# Patient Record
Sex: FEMALE | Race: BLACK OR AFRICAN AMERICAN | NOT HISPANIC OR LATINO | Employment: FULL TIME | URBAN - METROPOLITAN AREA
[De-identification: names, ages, dates, MRNs, and addresses within clinical notes are randomized per-mention and may not be internally consistent; named-entity substitution may affect disease eponyms.]

---

## 2025-06-09 ENCOUNTER — HOSPITAL ENCOUNTER (OUTPATIENT)
Dept: RADIOLOGY | Facility: CLINIC | Age: 30
Discharge: HOME | End: 2025-06-09
Payer: MEDICAID

## 2025-06-09 ENCOUNTER — OFFICE VISIT (OUTPATIENT)
Dept: ORTHOPEDIC SURGERY | Facility: CLINIC | Age: 30
End: 2025-06-09
Payer: MEDICAID

## 2025-06-09 VITALS — WEIGHT: 260 LBS | BODY MASS INDEX: 39.4 KG/M2 | HEIGHT: 68 IN

## 2025-06-09 DIAGNOSIS — S92.511A CLOSED FRACTURE OF PROXIMAL PHALANX OF LESSER TOE OF RIGHT FOOT, INITIAL ENCOUNTER: Primary | ICD-10-CM

## 2025-06-09 DIAGNOSIS — S92.511A CLOSED FRACTURE OF PROXIMAL PHALANX OF LESSER TOE OF RIGHT FOOT, INITIAL ENCOUNTER: ICD-10-CM

## 2025-06-09 PROCEDURE — 73660 X-RAY EXAM OF TOE(S): CPT | Mod: RIGHT SIDE | Performed by: RADIOLOGY

## 2025-06-09 PROCEDURE — 73660 X-RAY EXAM OF TOE(S): CPT | Mod: RT

## 2025-06-09 PROCEDURE — 3008F BODY MASS INDEX DOCD: CPT | Performed by: FAMILY MEDICINE

## 2025-06-09 PROCEDURE — 1036F TOBACCO NON-USER: CPT | Performed by: FAMILY MEDICINE

## 2025-06-09 PROCEDURE — 99203 OFFICE O/P NEW LOW 30 MIN: CPT | Performed by: FAMILY MEDICINE

## 2025-06-09 NOTE — PROGRESS NOTES
History of Present Illness   Chief Complaint   Patient presents with    Right Foot - Pain     Closed nondisplaced fracture of proximal phalanx of lesser toe        The patient is 30 y.o. female  here with a complaint of right foot fourth digit injury.  Injury occurred approximately 6 weeks ago after injuring her toe falling down some stairs, patient says that her toe seem to be pointing in the wrong direction, friend pulled on the toe to straighten it out.  She was seen in the [facility], operated by [facility] emergency department following injury, she had x-rays that revealed a nondisplaced fracture of the proximal phalanx of the fourth digit, she followed up in podiatry, recommended boot immobilization for 6 weeks and follow-up at that time.  Patient had insurance change and needed to follow-up with a different provider prompting her to be seen by me today for further evaluation.  Overall symptoms have improved, still with some swelling and mild discomfort in the fourth digit, feels like it is slightly crooked compared to her other foot and adjacent toes.  She denies any numbness or tingling.  Patient works as a  part-time, she is working in her boot.    Medical History[1]    Medication Documentation Review Audit    **Prior to Admission medications have not yet been reviewed**         RX Allergies[2]    Social History     Socioeconomic History    Marital status: Single     Spouse name: Not on file    Number of children: Not on file    Years of education: Not on file    Highest education level: Not on file   Occupational History    Not on file   Tobacco Use    Smoking status: Not on file    Smokeless tobacco: Not on file   Substance and Sexual Activity    Alcohol use: Not on file    Drug use: Not on file    Sexual activity: Not on file   Other Topics Concern    Not on file   Social History Narrative    Not on file     Social Drivers of Health     Financial Resource Strain: High Risk (4/26/2021)    Received from [facility]     Overall Financial Resource Strain (CARDIA)     Difficulty of Paying Living Expenses: Very hard   Food Insecurity: Food Insecurity Present (4/26/2021)    Received from [facility]    Hunger Vital Sign     Worried About Running Out of Food in the Last Year: Often true     Ran Out of Food in the Last Year: Not on file   Transportation Needs: Unmet Transportation Needs (4/26/2021)    Received from [facility]    PRAPARE - Transportation     Lack of Transportation (Medical): No     Lack of Transportation (Non-Medical): Yes   Physical Activity: Insufficiently Active (4/26/2021)    Received from [facility]    Exercise Vital Sign     Days of Exercise per Week: 7 days     Minutes of Exercise per Session: 10 min   Stress: Stress Concern Present (4/26/2021)    Received from [facility]    [place] [employer] - Occupational Stress Questionnaire     Feeling of Stress : Very much   Social Connections: Socially Isolated (4/26/2021)    Received from [facility]    Social Connection and Isolation Panel [NHANES]     Frequency of Communication with Friends and Family: Once a week     Frequency of Social Gatherings with Friends and Family: Never     Attends Tenriism Services: Never     Active Member of Clubs or Organizations: No     Attends Club or Organization Meetings: Not on file     Marital Status: Never    Intimate Partner Violence: Not on file   Housing Stability: High Risk (4/26/2021)    Received from [facility]    Housing Stability Vital Sign     Unable to Pay for Housing in the Last Year: Yes     Number of Places Lived in the Last Year: 1     Unstable Housing in the Last Year: No       Surgical History[3]       Review of Systems   GENERAL: Negative  GI: Negative  MUSCULOSKELETAL: See HPI  SKIN: Negative  NEURO:  Negative     Physical Exam:    General/Constitutional: well appearing, no distress, appears stated age  HEENT: sclera clear  Respiratory: non labored  breathing  Vascular: No edema, swelling or tenderness, except as noted in detailed exam.  Integumentary: No impressive skin lesions present, except as noted in detailed exam.  Neurological:  Alert and oriented   Psychological:  Normal mood and affect.  Musculoskeletal: Normal, except as noted in detailed exam and in HPI    Right foot fourth toe, there is some mild swelling, there is no gross deformity, mostly normal anatomical alignment, slight lateral deviation.  She has some mild residual tenderness of the proximal phalanx.  She can actively flex and extend the digit, there is some limitations of flexion at the IP joints.  There is no instability.  Sensation intact to light touch throughout, 2+ pedal pulses.       Imaging: X-rays of right foot from April 2025 were reviewed, nondisplaced proximal phalanx fracture is seen, repeat x-rays of the fourth digit obtained today and independently reviewed, there is a healing nondisplaced fracture of the proximal phalanx of the fourth digit.      Assessment   1. Closed fracture of proximal phalanx of lesser toe of right foot, initial encounter  XR toe right 2+ views            Plan: 6 weeks out from injury, doing well, some early healing is seen on x-ray, minimal tenderness to palpation on exam.  Recommend that she discontinue boot immobilization, recommend firm soled comfortable tennis shoe for symptoms, she would likely benefit from avoiding walking barefoot or in unsupportive shoes for the next several weeks, she can buddy tape for comfort, she can progress activities as tolerated.  She will follow-up as needed.         [1]   Past Medical History:  Diagnosis Date    Other conditions influencing health status     No significant past surgical history    Other specified health status     No pertinent past medical history   [2] Not on File  [3]   Past Surgical History:  Procedure Laterality Date    OTHER SURGICAL HISTORY  01/11/2017    Tympanoplasty    TONSILLECTOMY   01/11/2017    Tonsillectomy

## 2025-06-16 ENCOUNTER — APPOINTMENT (OUTPATIENT)
Dept: ORTHOPEDIC SURGERY | Facility: HOSPITAL | Age: 30
End: 2025-06-16
Payer: MEDICAID

## 2025-06-27 ENCOUNTER — APPOINTMENT (OUTPATIENT)
Dept: PRIMARY CARE | Facility: CLINIC | Age: 30
End: 2025-06-27
Payer: MEDICAID

## 2025-08-22 ENCOUNTER — APPOINTMENT (OUTPATIENT)
Facility: CLINIC | Age: 30
End: 2025-08-22
Payer: COMMERCIAL

## 2025-08-27 ENCOUNTER — CLINICAL SUPPORT (OUTPATIENT)
Dept: AUDIOLOGY | Facility: HOSPITAL | Age: 30
End: 2025-08-27
Payer: COMMERCIAL

## 2025-08-27 DIAGNOSIS — H90.12 CONDUCTIVE HEARING LOSS OF LEFT EAR WITH UNRESTRICTED HEARING OF RIGHT EAR: Primary | ICD-10-CM

## 2025-08-27 PROCEDURE — 92557 COMPREHENSIVE HEARING TEST: CPT

## 2025-08-27 PROCEDURE — 92567 TYMPANOMETRY: CPT

## 2025-08-27 ASSESSMENT — ENCOUNTER SYMPTOMS
VOMITING: 0
RHINORRHEA: 0
ABDOMINAL PAIN: 0
NECK PAIN: 0
COUGH: 0
SORE THROAT: 0
HEADACHES: 0
DIARRHEA: 0

## 2025-08-28 ENCOUNTER — OFFICE VISIT (OUTPATIENT)
Dept: OTOLARYNGOLOGY | Facility: CLINIC | Age: 30
End: 2025-08-28
Payer: COMMERCIAL

## 2025-08-28 VITALS
TEMPERATURE: 97.5 F | WEIGHT: 285 LBS | SYSTOLIC BLOOD PRESSURE: 134 MMHG | BODY MASS INDEX: 43.19 KG/M2 | DIASTOLIC BLOOD PRESSURE: 89 MMHG | HEIGHT: 68 IN | HEART RATE: 73 BPM

## 2025-08-28 DIAGNOSIS — H74.01 TYMPANOSCLEROSIS OF RIGHT EAR: ICD-10-CM

## 2025-08-28 DIAGNOSIS — R94.128 ABNORMAL TYMPANOGRAM: ICD-10-CM

## 2025-08-28 DIAGNOSIS — H72.12 ATTIC PERFORATION OF TYMPANIC MEMBRANE OF LEFT EAR: ICD-10-CM

## 2025-08-28 DIAGNOSIS — H93.8X1 SENSATION OF PLUGGED EAR ON RIGHT SIDE: Primary | ICD-10-CM

## 2025-08-28 DIAGNOSIS — H90.12 CONDUCTIVE HEARING LOSS OF LEFT EAR WITH UNRESTRICTED HEARING OF RIGHT EAR: ICD-10-CM

## 2025-08-28 PROCEDURE — 99202 OFFICE O/P NEW SF 15 MIN: CPT | Performed by: NURSE PRACTITIONER

## 2025-08-28 PROCEDURE — 99203 OFFICE O/P NEW LOW 30 MIN: CPT | Performed by: NURSE PRACTITIONER

## 2025-08-28 PROCEDURE — 1036F TOBACCO NON-USER: CPT | Performed by: NURSE PRACTITIONER

## 2025-08-28 PROCEDURE — 3008F BODY MASS INDEX DOCD: CPT | Performed by: NURSE PRACTITIONER

## 2025-08-28 ASSESSMENT — ENCOUNTER SYMPTOMS
OCCASIONAL FEELINGS OF UNSTEADINESS: 0
NECK PAIN: 0
RHINORRHEA: 0
VOMITING: 0
DEPRESSION: 0
LOSS OF SENSATION IN FEET: 0
SORE THROAT: 0
COUGH: 0
DIARRHEA: 0
ABDOMINAL PAIN: 0
HEADACHES: 0

## 2025-08-28 ASSESSMENT — PAIN SCALES - GENERAL: PAINLEVEL_OUTOF10: 0-NO PAIN
